# Patient Record
Sex: MALE | Race: WHITE
[De-identification: names, ages, dates, MRNs, and addresses within clinical notes are randomized per-mention and may not be internally consistent; named-entity substitution may affect disease eponyms.]

---

## 2018-08-12 ENCOUNTER — HOSPITAL ENCOUNTER (EMERGENCY)
Dept: HOSPITAL 45 - C.EDB | Age: 58
Discharge: HOME | End: 2018-08-12
Payer: COMMERCIAL

## 2018-08-12 VITALS — SYSTOLIC BLOOD PRESSURE: 164 MMHG | OXYGEN SATURATION: 94 % | DIASTOLIC BLOOD PRESSURE: 96 MMHG | HEART RATE: 57 BPM

## 2018-08-12 VITALS
WEIGHT: 242.51 LBS | WEIGHT: 242.51 LBS | BODY MASS INDEX: 36.75 KG/M2 | HEIGHT: 67.99 IN | BODY MASS INDEX: 36.75 KG/M2 | HEIGHT: 67.99 IN

## 2018-08-12 VITALS — TEMPERATURE: 98.42 F

## 2018-08-12 DIAGNOSIS — M54.5: Primary | ICD-10-CM

## 2018-08-12 DIAGNOSIS — R03.0: ICD-10-CM

## 2018-08-12 DIAGNOSIS — Z79.82: ICD-10-CM

## 2018-08-12 NOTE — EMERGENCY ROOM VISIT NOTE
History


First contact with patient:  07:12


Chief Complaint:  BACK PAIN


Stated Complaint:  BACK PAIN





History of Present Illness


The patient is a 58 year old male who presents to the Emergency Room with 

complaints of low back pain.  The patient states that over the past week he has 

been doing yard work.  He feels like he may have pulled a muscle while he was 

mowing his grass or pushing a wheelbarrow.  The patient complains of some 

discomfort in his low back that radiates down his right leg to about the knee.  

He states that it seemed to slowly worsen over the past week.  Denies any 

specific trauma to the area.  He reports that certain movements worsen his 

pain.  He denies any abdominal pains, fevers or recent illness.  Denies any 

bowel or bladder dysfunction.  Nothing seems to help his symptoms get better.





Review of Systems


As above otherwise negative for 10 systems





Past Medical/Surgical History


None





Social History


Smoking Status:  Never Smoker





Current/Historical Medications


Scheduled


Aspirin (Aspirin Ec), 81 MG PO DAILY


Cyclobenzaprine Hcl (Flexeril), 1 TAB PO TID


Lisinopril (Zestril), 20 MG PO DAILY


Prednisone (Prednisone), 2 TAB PO DAILY


Simvastatin (Zocor), 40 MG PO QPM





Scheduled PRN


Ketorolac (Toradol), 10 MG PO Q6H PRN for Pain





Physical Exam


Vital Signs











  Date Time  Temp Pulse Resp B/P (MAP) Pulse Ox O2 Delivery O2 Flow Rate FiO2


 


8/12/18 08:51  57 20 164/96 94   


 


8/12/18 07:05 36.9 69 18 169/97 93 Room Air  











Physical Exam


CONSTITUTIONAL/VITAL SIGNS: Reviewed / noted above.


GENERAL: Non-toxic in appearance. 


INTEGUMENTARY: Warm, dry, and Pink.


HEAD: Normocephalic.


EYES: without scleral icterus or trauma.


ENT/OROPHARYNX: clear and moist.


LYMPHADENOPATHY/NECK: Is supple without lymphadenopathy or meningismus.


RESPIRATORY: Lungs clear and equal.


CARDIOVASCULAR: Regular rate and rhythm.


GI/ABDOMEN: Soft and nontender.  No organomegaly or pulsatile mass.  No rebound 

or guarding. Normal bowel sounds.


EXTREMITIES: Warm and well perfused.  Negative straight leg raise.  Symmetric 

strength in the bilateral lower extremities with normal sensory and motor 

function.


BACK: No CVA tenderness.  There is some tenderness to palpation of the right 

paraspinal musculature.


NEUROLOGICAL: Intact without focal deficits.  


PSYCHIATRIC: normal affect.


MUSCULOSKELETAL: Normally developed with good muscle tone.  





TRIAGE NURSING DOCUMENTATION REVIEWED.





Medical Decision & Procedures


ER Provider


Diagnostic Interpretation:


X-rays of the lumbar spine: Per my interpretation did not show any acute 

fractures or subluxation.  Narrowed disc space L5-S1.





Medications Administered











 Medications


  (Trade)  Dose


 Ordered  Sig/Radha


 Route  Start Time


 Stop Time Status Last Admin


Dose Admin


 


 Prednisone


  (PredniSONE TAB)  50 mg  ONE  ONCE


 PO  8/12/18 07:30


 8/12/18 07:31 DC 8/12/18 07:44


50 MG


 


 Ketorolac


 Tromethamine


  (Toradol Inj)  60 mg  NOW  STAT


 IM  8/12/18 07:20


 8/12/18 07:23 DC 8/12/18 07:45


60 MG


 


 Cyclobenzaprine


 HCl


  (Flexeril Tab)  10 mg  NOW


 PO  8/12/18 07:30


 8/12/18 09:06 DC 8/12/18 07:44


10 MG











ED Course


The patient was treated with Toradol IM, Flexeril p.o. and prednisone p.o.





Medical Decision


differential considered includes cauda equina syndrome, conus medullaris, 

spinal cord compression syndrome, peripheral nerve compression, fractures or 

subluxations, intra-abdominal pathology such as abdominal aortic aneurysm or 

kidney stones, muscle strain, transverse myelitis, spinal cord injury.





This is a 58-year-old male who presents as above with what sounds like a 

mechanical type of back pain.  There was no traumatic injury to his back.  He 

denies any bowel or bladder issues or numbness or weakness in the lower 

extremities.  He does have some tenderness to the right paraspinal musculature 

on exam.  He does report some radiation of the pain into his right buttock area 

and down to about his knee.  His exam was relatively unremarkable with regards 

to any weakness or sensory deficits.  X-rays did not show any acute process.  

The patient was treated with IM Toradol, p.o. prednisone and p.o. Flexeril.  He 

will be discharged on these.  He is felt to be stable for discharge.





Blood Pressure Screening


Patient's blood pressure:  Elevated blood pressure


Blood pressure disposition:  Elevated BP felt to be situational, Referred to PCP





Impression





 Primary Impression:  


 Low back pain





Departure Information


Dispostion


Home / Self-Care





Prescriptions





Cyclobenzaprine Hcl (FLEXERIL) 10 Mg Tab


1 TAB PO TID for 10 Days, #30 TAB


   Prov: Mishock,Chapo, D.O.         8/12/18 


Ketorolac (Toradol) 10 Mg Tab


10 MG PO Q6H Y for Pain, #20 TAB


   Prov: Chapo Hilton D.O.         8/12/18 


Prednisone (Prednisone) 20 Mg Tab


2 TAB PO DAILY for 5 Days, #10 TAB


   Prov: Chapo Hilton D.O.         8/12/18





Referrals


Harini Garcia (PCP)





Patient Instructions


Back Pain - [facility], [facility]





Additional Instructions





Prednisone as prescribed.





Flexeril as prescribed.  This may cause drowsiness.  Avoid driving or working 

with machinery for 6 hours after taking.





Toradol as prescribed.





Follow-up with your doctor this week for recheck.





Your blood pressure today was elevated.  Follow-up with your doctor this week 

for recheck.

## 2018-08-13 NOTE — DIAGNOSTIC IMAGING REPORT
L-SPINE MIN 4 VIEWS ROUTINE



CLINICAL HISTORY: Low back pain.    



COMPARISON: None



FINDINGS:  Alignment of the lumbar spine is anatomic. Vertebral body heights are

maintained. There is moderate disc space narrowing at L5-S1. There is multilevel

endplate osteophytosis and moderate multilevel facet arthrosis. No acute

fracture is identified. Extensive atherosclerotic plaque of the abdominal aorta

is noted.



IMPRESSION: 



1. No acute lumbar spine fracture or subluxation.



2. Moderate disc space narrowing at L5-S1.



3. Moderate multilevel facet arthrosis.



4. Extensive atherosclerotic plaque of the abdominal aorta.







Electronically signed by:  Xavier Zamudio M.D.

8/12/2018 8:10 AM



Dictated Date/Time:  8/12/2018 8:08 AM